# Patient Record
Sex: FEMALE | Race: WHITE | NOT HISPANIC OR LATINO | Employment: FULL TIME | URBAN - METROPOLITAN AREA
[De-identification: names, ages, dates, MRNs, and addresses within clinical notes are randomized per-mention and may not be internally consistent; named-entity substitution may affect disease eponyms.]

---

## 2017-07-06 ENCOUNTER — HOSPITAL ENCOUNTER (OUTPATIENT)
Dept: URGENT CARE | Facility: CLINIC | Age: 57
Discharge: HOME | End: 2017-07-06
Attending: FAMILY MEDICINE | Admitting: FAMILY MEDICINE

## 2018-07-19 RX ORDER — POTASSIUM CHLORIDE 750 MG/1
10 TABLET, EXTENDED RELEASE ORAL 2 TIMES DAILY
COMMUNITY
End: 2018-08-17 | Stop reason: DRUGHIGH

## 2018-07-19 RX ORDER — PREDNISONE 10 MG/1
10 TABLET ORAL DAILY
COMMUNITY
End: 2018-07-20

## 2018-07-19 RX ORDER — ESTRADIOL 10 UG/1
1 INSERT VAGINAL 2 TIMES WEEKLY
COMMUNITY

## 2018-07-20 ENCOUNTER — OFFICE VISIT (OUTPATIENT)
Dept: SURGERY | Facility: CLINIC | Age: 58
End: 2018-07-20

## 2018-07-20 VITALS
DIASTOLIC BLOOD PRESSURE: 80 MMHG | HEIGHT: 69 IN | BODY MASS INDEX: 16 KG/M2 | HEART RATE: 58 BPM | SYSTOLIC BLOOD PRESSURE: 120 MMHG | WEIGHT: 108 LBS | TEMPERATURE: 97.6 F | OXYGEN SATURATION: 98 %

## 2018-07-20 DIAGNOSIS — K64.8 PROLAPSED HEMORRHOIDS: Primary | ICD-10-CM

## 2018-07-20 PROCEDURE — 46600 DIAGNOSTIC ANOSCOPY SPX: CPT | Performed by: COLON & RECTAL SURGERY

## 2018-07-20 PROCEDURE — 99244 OFF/OP CNSLTJ NEW/EST MOD 40: CPT | Performed by: COLON & RECTAL SURGERY

## 2018-07-20 RX ORDER — HYDROCORTISONE ACETATE 25 MG/1
25 SUPPOSITORY RECTAL EVERY 12 HOURS
Qty: 14 SUPPOSITORY | Refills: 1 | Status: SHIPPED | OUTPATIENT
Start: 2018-07-20 | End: 2018-07-27

## 2018-07-20 NOTE — PROGRESS NOTES
Ebony Hobson is a 58 y.o. female who is seen as a consult at the request of Maritza Arteaga DO for extruding rectal tissue    HPI:    Pt c/o feeling gassy for the past 6 months  1 month ago, she was outside checking herself for ticks, and she thought she noted a rectal prolapse    No blood or mucus per rectum  She thinks the tissue might be partially extruding now    No pain    She states she had severe tear during labor with her first child 20 years ago    She has daily BM  Denies straining    She takes a fiber supplement daily    Most recent colonoscopy ~2011 in [place]: no polyps per pt    She is a runner    FamHx: grandmother gastric cancer age 80s.  No known hx colon polyps or colon cancer    She is doing [employer] at [employer] this fall: [employer] professor at [employer]    Past Medical History:   Diagnosis Date   • Anemia    • Effusion of right knee    • Ganglion cyst    • History of concussion    • Hypercalcemia    • Hypokalemia    • Kidney stones    • Lateral epicondylitis    • Medial meniscus tear     RIGHT KNEE   • Neuropathy    • OA (osteoarthritis) of knee    • Pain in right acromioclavicular joint    • Rectal prolapse    • Stress fracture of left foot     2ND AND 3RD METATARSALS       Past Surgical History:   Procedure Laterality Date   • ROTATOR CUFF REPAIR  2014       Social History:   reports that she has never smoked. She has never used smokeless tobacco. She reports that she drinks alcohol. She reports that she does not use drugs.      Marriage status:     Family History   Problem Relation Age of Onset   • Leukemia Father    • Stroke Other          Current Outpatient Prescriptions:   •  estradiol (VAGIFEM) 10 MCG tablet vaginal tablet, Insert 1 tablet into the vagina 2 (Two) Times a Week., Disp: , Rfl:   •  potassium chloride (K-DUR,KLOR-CON) 10 MEQ CR tablet, Take 10 mEq by mouth 2 (Two) Times a Day., Disp: , Rfl:     Allergy  Patient has no known allergies.    Review of  Systems   Constitution: Negative for decreased appetite, weakness and weight gain.   HENT: Negative for congestion, hearing loss and hoarse voice.    Eyes: Negative for blurred vision, discharge and visual disturbance.   Cardiovascular: Negative for chest pain, cyanosis and leg swelling.   Respiratory: Negative for cough, shortness of breath, sleep disturbances due to breathing and snoring.    Endocrine: Negative for cold intolerance and heat intolerance.   Hematologic/Lymphatic: Does not bruise/bleed easily.   Skin: Negative for itching, poor wound healing and skin cancer.   Musculoskeletal: Negative for arthritis, back pain, joint pain and joint swelling.   Gastrointestinal: Negative for abdominal pain, change in bowel habit, bowel incontinence and constipation.   Genitourinary: Negative for bladder incontinence, dysuria and hematuria.   Neurological: Negative for brief paralysis, excessive daytime sleepiness, focal weakness, headaches and light-headedness.   Psychiatric/Behavioral: Negative for altered mental status and hallucinations. The patient does not have insomnia.    Allergic/Immunologic: Negative for HIV exposure and persistent infections.   All other systems reviewed and are negative.      Vitals:    07/20/18 0948   BP: 120/80   Pulse: 58   Temp: 97.6 °F (36.4 °C)   SpO2: 98%     Body mass index is 15.95 kg/m².    Physical Exam   Constitutional: She is oriented to person, place, and time. She appears well-developed and well-nourished. No distress.   thin   HENT:   Head: Normocephalic and atraumatic.   Nose: Nose normal.   Mouth/Throat: Oropharynx is clear and moist.   Eyes: Pupils are equal, round, and reactive to light. Conjunctivae and EOM are normal.   Neck: Normal range of motion. No tracheal deviation present.   Pulmonary/Chest: Effort normal and breath sounds normal. No respiratory distress.   Abdominal: Soft. Bowel sounds are normal. She exhibits no distension.   Genitourinary:   Genitourinary  Comments: Perianal exam: external hem - IH prolapse visible on bear down.  No rectal prolapse on bear down  ERLINDA- slightly decreased tone, no masses  Anoscopy performed:  Grade 4 x 1 internal hem   Musculoskeletal: Normal range of motion. She exhibits no edema or deformity.   Neurological: She is alert and oriented to person, place, and time. No cranial nerve deficit. Coordination and gait normal.   Skin: Skin is warm and dry.   Psychiatric: She has a normal mood and affect. Her behavior is normal. Judgment normal.       Review of Medical Record: no pertinent records available for review    Assessment:  1. Prolapsed hemorrhoids        Plan:    Discussion with patient regarding prolapsed hemorrhoid.  No rectal prolapse.    For the hemorrhoids,she has not tried medical management.  I recommended for patient to treat conservatively with continuing fiber, and the hemorrhoid suppositories.  I wrote patient a prescription for hydrocortisone 2.5% supp and gave patient instructions.    Can consider in-office RBL if no improvement with conservative therapy.    RTC 4 weeks    Scribed for Paresh Dominguez MD by Clotilde Milan PA-C 7/20/2018  This patient was evaluated by me, recommendations made, documentation reviewed, edited, and revised by me, Paresh Dominguez MD

## 2018-08-17 ENCOUNTER — PROCEDURE VISIT (OUTPATIENT)
Dept: SURGERY | Facility: CLINIC | Age: 58
End: 2018-08-17

## 2018-08-17 VITALS
SYSTOLIC BLOOD PRESSURE: 112 MMHG | OXYGEN SATURATION: 96 % | TEMPERATURE: 97.9 F | HEIGHT: 70 IN | DIASTOLIC BLOOD PRESSURE: 66 MMHG | HEART RATE: 62 BPM | WEIGHT: 115.6 LBS | BODY MASS INDEX: 16.55 KG/M2

## 2018-08-17 DIAGNOSIS — K64.8 PROLAPSED HEMORRHOIDS: Primary | ICD-10-CM

## 2018-08-17 PROCEDURE — 99212 OFFICE O/P EST SF 10 MIN: CPT | Performed by: COLON & RECTAL SURGERY

## 2018-08-17 PROCEDURE — 46221 LIGATION OF HEMORRHOID(S): CPT | Performed by: COLON & RECTAL SURGERY

## 2018-08-17 RX ORDER — POTASSIUM CHLORIDE 20 MEQ/1
TABLET, EXTENDED RELEASE ORAL
COMMUNITY
Start: 2018-08-14

## 2018-08-17 NOTE — PROGRESS NOTES
"Ebony Hobson is a 58 y.o. female in for follow up of Prolapsed hemorrhoids    Tried fiber , hc supp,   Still feels extra tissue  Going on 3 mon work cruise starting early Sept    /66 (BP Location: Left arm, Patient Position: Sitting, Cuff Size: Adult)   Pulse 62   Temp 97.9 °F (36.6 °C) (Oral)   Ht 177.8 cm (70\")   Wt 52.4 kg (115 lb 9.6 oz)   LMP  (LMP Unknown)   SpO2 96%   Breastfeeding? No   BMI 16.59 kg/m²   Body mass index is 16.59 kg/m².      PE:  Physical Exam   Constitutional: She appears well-developed. No distress.   HENT:   Head: Normocephalic and atraumatic.   Abdominal: Soft. She exhibits no distension.   Genitourinary:   Genitourinary Comments: Perianal exam: external hem - minor  ERLINDA- good tone, no masses  Anoscopy performed:  Grade 3 x 3 internal hem     Musculoskeletal: Normal range of motion.   Neurological: She is alert.   Psychiatric: Thought content normal.         Assessment:   1. Prolapsed hemorrhoids         Plan:  Failed conservative management. Recommend RBL.  Discussed risk of bleeding and infection.  Pt wants to proceed.      Procedure: Rubber band ligation of internal hemorrhoid    FINDINGS: There were large enlarged hemorrhoids in the right anterior, right posterior and left lateral  postion.    PROCEDURE: After discussion and acceptance of all risks, benefits and alternatives an anoscope was inserted into the anal canal.  The hemorrhoic pedicals were defined and a rubber band was applied to the right anterior, right posterior and left lateral hemorrhoids approximately 1 cm above the dentate line.  The patient was discharged when comfortable with instructions as to outcome, complications and follow up.          "

## 2018-08-31 ENCOUNTER — OFFICE VISIT (OUTPATIENT)
Dept: SURGERY | Facility: CLINIC | Age: 58
End: 2018-08-31

## 2018-08-31 VITALS
HEIGHT: 70 IN | BODY MASS INDEX: 15.75 KG/M2 | SYSTOLIC BLOOD PRESSURE: 120 MMHG | DIASTOLIC BLOOD PRESSURE: 80 MMHG | OXYGEN SATURATION: 98 % | HEART RATE: 87 BPM | WEIGHT: 110 LBS

## 2018-08-31 DIAGNOSIS — K64.8 PROLAPSED HEMORRHOIDS: Primary | ICD-10-CM

## 2018-08-31 PROCEDURE — 99212 OFFICE O/P EST SF 10 MIN: CPT | Performed by: COLON & RECTAL SURGERY

## 2020-11-25 PROBLEM — M25.571 ARTHRALGIA OF RIGHT FOOT: Status: ACTIVE | Noted: 2017-07-06

## 2020-11-25 PROBLEM — S92.534A: Status: ACTIVE | Noted: 2017-07-06

## 2020-11-30 ENCOUNTER — OFFICE VISIT (OUTPATIENT)
Dept: SURGERY | Facility: CLINIC | Age: 60
End: 2020-11-30

## 2020-11-30 VITALS
SYSTOLIC BLOOD PRESSURE: 120 MMHG | BODY MASS INDEX: 17.12 KG/M2 | TEMPERATURE: 97.8 F | WEIGHT: 115.6 LBS | OXYGEN SATURATION: 99 % | HEIGHT: 69 IN | HEART RATE: 52 BPM | DIASTOLIC BLOOD PRESSURE: 74 MMHG

## 2020-11-30 DIAGNOSIS — M62.89 PELVIC FLOOR DYSFUNCTION IN FEMALE: ICD-10-CM

## 2020-11-30 DIAGNOSIS — K64.8 INTERNAL HEMORRHOIDS WITH COMPLICATION: ICD-10-CM

## 2020-11-30 DIAGNOSIS — R15.9 FECAL SOILING DUE TO FECAL INCONTINENCE: Primary | ICD-10-CM

## 2020-11-30 PROCEDURE — 99213 OFFICE O/P EST LOW 20 MIN: CPT | Performed by: COLON & RECTAL SURGERY

## 2021-02-05 ENCOUNTER — TRANSCRIBE ORDERS (OUTPATIENT)
Dept: ADMINISTRATIVE | Facility: HOSPITAL | Age: 61
End: 2021-02-05

## 2021-02-05 DIAGNOSIS — Z01.818 OTHER SPECIFIED PRE-OPERATIVE EXAMINATION: Primary | ICD-10-CM

## 2021-02-08 ENCOUNTER — LAB (OUTPATIENT)
Dept: LAB | Facility: HOSPITAL | Age: 61
End: 2021-02-08

## 2021-02-08 DIAGNOSIS — Z01.818 OTHER SPECIFIED PRE-OPERATIVE EXAMINATION: ICD-10-CM

## 2021-02-08 PROBLEM — S43.203A SUBLUXATION OF STERNOCLAVICULAR JOINT: Status: ACTIVE | Noted: 2020-09-10

## 2021-02-08 PROBLEM — M71.21 SYNOVIAL CYST OF RIGHT POPLITEAL SPACE: Status: ACTIVE | Noted: 2019-01-09

## 2021-02-08 PROBLEM — S92.819A: Status: ACTIVE | Noted: 2019-01-10

## 2021-02-08 PROBLEM — M23.40 LOOSE BODY IN KNEE: Status: ACTIVE | Noted: 2019-04-30

## 2021-02-08 PROBLEM — M17.11 OSTEOARTHRITIS OF RIGHT KNEE: Status: ACTIVE | Noted: 2018-06-04

## 2021-02-08 PROBLEM — S83.249A TEAR OF MEDIAL MENISCUS OF KNEE: Status: ACTIVE | Noted: 2018-06-04

## 2021-02-08 PROBLEM — M84.376A STRESS FRACTURE OF FOOT: Status: ACTIVE | Noted: 2017-10-23

## 2021-02-08 PROBLEM — E55.9 VITAMIN D DEFICIENCY: Status: ACTIVE | Noted: 2018-08-14

## 2021-02-08 PROBLEM — M19.072 OSTEOARTHRITIS OF LEFT FOOT: Status: ACTIVE | Noted: 2019-04-30

## 2021-02-08 PROBLEM — M25.519 STERNOCLAVICULAR JOINT PAIN: Status: ACTIVE | Noted: 2020-09-10

## 2021-02-08 LAB — SARS-COV-2 ORF1AB RESP QL NAA+PROBE: NOT DETECTED

## 2021-02-08 PROCEDURE — C9803 HOPD COVID-19 SPEC COLLECT: HCPCS

## 2021-02-08 PROCEDURE — U0004 COV-19 TEST NON-CDC HGH THRU: HCPCS

## 2021-02-10 ENCOUNTER — ANESTHESIA (OUTPATIENT)
Dept: GASTROENTEROLOGY | Facility: HOSPITAL | Age: 61
End: 2021-02-10

## 2021-02-10 ENCOUNTER — ANESTHESIA EVENT (OUTPATIENT)
Dept: GASTROENTEROLOGY | Facility: HOSPITAL | Age: 61
End: 2021-02-10

## 2021-02-10 ENCOUNTER — HOSPITAL ENCOUNTER (OUTPATIENT)
Facility: HOSPITAL | Age: 61
Setting detail: HOSPITAL OUTPATIENT SURGERY
Discharge: HOME | End: 2021-02-10
Attending: COLON & RECTAL SURGERY | Admitting: COLON & RECTAL SURGERY

## 2021-02-10 VITALS
OXYGEN SATURATION: 98 % | DIASTOLIC BLOOD PRESSURE: 72 MMHG | HEART RATE: 85 BPM | BODY MASS INDEX: 16.69 KG/M2 | WEIGHT: 113 LBS | RESPIRATION RATE: 18 BRPM | SYSTOLIC BLOOD PRESSURE: 109 MMHG

## 2021-02-10 DIAGNOSIS — M62.89 PELVIC FLOOR DYSFUNCTION IN FEMALE: ICD-10-CM

## 2021-02-10 DIAGNOSIS — K64.8 INTERNAL HEMORRHOIDS WITH COMPLICATION: ICD-10-CM

## 2021-02-10 DIAGNOSIS — R15.9 FECAL SOILING DUE TO FECAL INCONTINENCE: ICD-10-CM

## 2021-02-10 PROCEDURE — 45391 COLONOSCOPY W/ENDOSCOPE US: CPT | Performed by: COLON & RECTAL SURGERY

## 2021-02-10 PROCEDURE — 25010000002 PROPOFOL 10 MG/ML EMULSION: Performed by: ANESTHESIOLOGY

## 2021-02-10 PROCEDURE — 76872 US TRANSRECTAL: CPT | Performed by: COLON & RECTAL SURGERY

## 2021-02-10 PROCEDURE — 91122 HC ANORECTAL MANOMETRY: CPT | Performed by: COLON & RECTAL SURGERY

## 2021-02-10 RX ORDER — LIDOCAINE HYDROCHLORIDE 20 MG/ML
INJECTION, SOLUTION INFILTRATION; PERINEURAL AS NEEDED
Status: DISCONTINUED | OUTPATIENT
Start: 2021-02-10 | End: 2021-02-10 | Stop reason: SURG

## 2021-02-10 RX ORDER — DIPHENOXYLATE HYDROCHLORIDE AND ATROPINE SULFATE 2.5; .025 MG/1; MG/1
TABLET ORAL DAILY
COMMUNITY

## 2021-02-10 RX ORDER — PROMETHAZINE HYDROCHLORIDE 25 MG/1
25 TABLET ORAL ONCE AS NEEDED
Status: DISCONTINUED | OUTPATIENT
Start: 2021-02-10 | End: 2021-02-10 | Stop reason: HOSPADM

## 2021-02-10 RX ORDER — PROPOFOL 10 MG/ML
VIAL (ML) INTRAVENOUS CONTINUOUS PRN
Status: DISCONTINUED | OUTPATIENT
Start: 2021-02-10 | End: 2021-02-10 | Stop reason: SURG

## 2021-02-10 RX ORDER — EPHEDRINE SULFATE 50 MG/ML
INJECTION, SOLUTION INTRAVENOUS AS NEEDED
Status: DISCONTINUED | OUTPATIENT
Start: 2021-02-10 | End: 2021-02-10 | Stop reason: SURG

## 2021-02-10 RX ORDER — PROMETHAZINE HYDROCHLORIDE 25 MG/1
25 SUPPOSITORY RECTAL ONCE AS NEEDED
Status: DISCONTINUED | OUTPATIENT
Start: 2021-02-10 | End: 2021-02-10 | Stop reason: HOSPADM

## 2021-02-10 RX ORDER — SODIUM CHLORIDE, SODIUM LACTATE, POTASSIUM CHLORIDE, CALCIUM CHLORIDE 600; 310; 30; 20 MG/100ML; MG/100ML; MG/100ML; MG/100ML
30 INJECTION, SOLUTION INTRAVENOUS CONTINUOUS PRN
Status: DISCONTINUED | OUTPATIENT
Start: 2021-02-10 | End: 2021-02-10 | Stop reason: HOSPADM

## 2021-02-10 RX ADMIN — EPHEDRINE SULFATE 20 MG: 50 INJECTION INTRAVENOUS at 09:58

## 2021-02-10 RX ADMIN — LIDOCAINE HYDROCHLORIDE 60 MG: 20 INJECTION, SOLUTION INFILTRATION; PERINEURAL at 09:35

## 2021-02-10 RX ADMIN — SODIUM CHLORIDE, POTASSIUM CHLORIDE, SODIUM LACTATE AND CALCIUM CHLORIDE 30 ML/HR: 600; 310; 30; 20 INJECTION, SOLUTION INTRAVENOUS at 09:05

## 2021-02-10 RX ADMIN — SODIUM CHLORIDE, POTASSIUM CHLORIDE, SODIUM LACTATE AND CALCIUM CHLORIDE: 600; 310; 30; 20 INJECTION, SOLUTION INTRAVENOUS at 09:33

## 2021-02-10 RX ADMIN — PROPOFOL 200 MCG/KG/MIN: 10 INJECTION, EMULSION INTRAVENOUS at 09:35

## 2021-02-10 NOTE — H&P
Ebony Hobson is a 60 y.o. female in for complaint of Fecal soiling due to fecal incontinence     Internal hemorrhoids with complication     Pelvic floor dysfunction in female     S/p RBL 8/17/2018  Feels like anus is not tight; having flatus incontinence and fecal smearing on underwear. Some fecal urgency. Symptoms over the past year. Denies rb or pain; possible minor prolapsed tissue. Denies anal cream, ss. Uses fibercon 2 pills daily. BM daily; [place] 2-4. No straining. Screening cy 10 yrs ago-no polyps. No fam hx colon polyps or ca. Hx Grade IV tear with first child.     Past Medical History:   Diagnosis Date   • Anemia    • Arthralgia of right acromioclavicular joint 10/1/2014   • Arthralgia of right foot 7/6/2017   • Back pain 11/14/2012   • Benign lipomatous neoplasm, unspecified 10/17/2016   • Closed fracture of sesamoid bone of foot 1/10/2019    Description: Left, lateral/fibular sesamoid chronic fragmented   • COVID-19 virus infection 01/25/2021    SEEN AT [facility]   • Effusion of right knee    • Fecal soiling due to fecal incontinence 11/2020   • Fracture of right toe 07/16/2017    RIGHT SMALL TOE, SEEN AT [facility]   • Ganglion cyst    • Hemorrhoids    • History of concussion    • Hypercalcemia    • Hypokalemia    • Kidney stones    • Lateral epicondylitis    • Loose body in knee 4/30/2019    Description: Right   • Medial meniscus tear     RIGHT KNEE   • Nephropathy 12/29/2016   • Neuropathy    • Nondisplaced fracture of distal phalanx of right lesser toe(s), initial encounter for closed fracture 7/6/2017   • OA (osteoarthritis) of knee    • Pain in right acromioclavicular joint    • Pelvic floor dysfunction in female 11/2020   • Rectal prolapse    • Sternoclavicular joint pain 9/10/2020    Description: Right   • Stress fracture of foot 10/23/2017    Description: Left, 2nd and 3rd   • Stress fracture of left foot     2ND AND 3RD METATARSALS   • Subluxation of sternoclavicular joint 9/10/2020     "Description: Right   • Synovial cyst of right popliteal space 1/9/2019   • Tear of medial meniscus of knee 6/4/2018    Description: Right  Description: Right   • Urinary tract infection 11/14/2012   • Vitamin D deficiency 8/14/2018       Past Surgical History:   Procedure Laterality Date   • HEMORRHOID BANDING N/A 08/17/2018    DR. LING [place]   • ROTATOR CUFF REPAIR Right 2014       /74 (BP Location: Left arm, Patient Position: Sitting, Cuff Size: Small Adult)   Pulse 52   Temp 97.8 °F (36.6 °C)   Ht 175.3 cm (69\")   Wt 52.4 kg (115 lb 9.6 oz)   LMP  (LMP Unknown)   SpO2 99%   Breastfeeding No   BMI 17.07 kg/m²   Body mass index is 17.07 kg/m².        PE:  Physical Exam  Exam conducted with a chaperone present.   Constitutional:       General: She is not in acute distress.     Appearance: She is well-developed.   HENT:      Head: Normocephalic and atraumatic.   Abdominal:      General: There is no distension.      Palpations: Abdomen is soft.   Genitourinary:     Comments: Perianal exam: external hem - 1 small  ERLINDA- weak tone especially anteriorly, no masses    Musculoskeletal: Normal range of motion.   Neurological:      Mental Status: She is alert.   Psychiatric:         Thought Content: Thought content normal.               Assessment:   1. Fecal soiling due to fecal incontinence    2. Internal hemorrhoids with complication    3. Pelvic floor dysfunction in female       S/p RBL 8/17/2018  Plan:  I recommend fiber therapy and detailed and gave written instructions on how to achieve a high fiber diet. Increase fiber to 2 pills am and pm daily ongoing. I discussed SNS implantation and pelvic floor PT; either can be used to assist with sphincter control and pelvic floor strengthening. I recommend cy (routine) with eus to determine sphincter thinning. Written referral info provided for [facility] for pelvic floor strengthening. Risks, benefits, alternatives explained; patient questions answered; patient " agrees and wishes to proceed.

## 2021-02-10 NOTE — ANESTHESIA PREPROCEDURE EVALUATION
Anesthesia Evaluation     NPO Solid Status: > 8 hours             Airway   Mallampati: II  TM distance: >3 FB  Neck ROM: full  Dental - normal exam     Pulmonary - normal exam   (-) COPD, asthma  Cardiovascular - normal exam        Neuro/Psych  GI/Hepatic/Renal/Endo    (+)   renal disease stones,     Musculoskeletal     (+) back pain,   Abdominal    Substance History      OB/GYN          Other                        Anesthesia Plan    ASA 2     MAC       Anesthetic plan, all risks, benefits, and alternatives have been provided, discussed and informed consent has been obtained with: patient.

## 2021-02-10 NOTE — OP NOTE
02/10/21    Pre-and postoperative diagnosis: fecal incontinence    Surgeon: Paresh Dominguez MD    Indication:  Patient is a 60 y.o. year-old female who complains about fecal incontinence.     Procedure: Endorectal ultrasound    Patient was in the left lateral decubitus position with copious K-Y jelly endorectal ultrasound wand was placed in the anal canal.  Multiple images were obtained.  Puborectalis muscle was visualized.  Internal and external sphincter were visualized.  The internal sphincter is distrupted anteriorly.  External sphincter is intact..  Perineal body measures 6 mm.

## 2021-02-10 NOTE — ANESTHESIA POSTPROCEDURE EVALUATION
Patient: Ebony Hobson    Procedure Summary     Date: 02/10/21 Room / Location: [facility] ENDOSCOPY 9 / [facility] ENDOSCOPY    Anesthesia Start: 0933 Anesthesia Stop: 1017    Procedures:       COLONOSCOPY to cecum (N/A )      ANORECTAL MANOMETRY (N/A Anus)      ULTRASOUND TRANSRECTAL (Rectum) Diagnosis:       Internal hemorrhoids with complication      Fecal soiling due to fecal incontinence      Pelvic floor dysfunction in female      (Internal hemorrhoids with complication [K64.8])      (Fecal soiling due to fecal incontinence [R15.9])      (Pelvic floor dysfunction in female [M62.89])    Surgeon: Paresh Dominguez MD Provider: Seth Castillo MD    Anesthesia Type: MAC ASA Status: 2          Anesthesia Type: MAC    Vitals  Vitals Value Taken Time   /72 02/10/21 1034   Temp     Pulse 85 02/10/21 1034   Resp 18 02/10/21 1034   SpO2 98 % 02/10/21 1034           Post Anesthesia Care and Evaluation    Patient location during evaluation: bedside  Pain management: adequate  Airway patency: patent  Anesthetic complications: No anesthetic complications    Cardiovascular status: acceptable  Respiratory status: acceptable  Hydration status: acceptable

## 2021-03-12 ENCOUNTER — DOCUMENTATION (OUTPATIENT)
Dept: SURGERY | Facility: CLINIC | Age: 61
End: 2021-03-12

## 2021-03-22 ENCOUNTER — TREATMENT (OUTPATIENT)
Dept: PHYSICAL THERAPY | Facility: CLINIC | Age: 61
End: 2021-03-22

## 2021-03-22 DIAGNOSIS — M25.511 PAIN OF RIGHT STERNOCLAVICULAR JOINT: Primary | ICD-10-CM

## 2021-03-22 PROCEDURE — 97112 NEUROMUSCULAR REEDUCATION: CPT | Performed by: PHYSICAL THERAPIST

## 2021-03-22 PROCEDURE — 97110 THERAPEUTIC EXERCISES: CPT | Performed by: PHYSICAL THERAPIST

## 2021-03-22 PROCEDURE — 97161 PT EVAL LOW COMPLEX 20 MIN: CPT | Performed by: PHYSICAL THERAPIST

## 2021-03-22 NOTE — PROGRESS NOTES
Physical Therapy Initial Evaluation and Plan of Care    Patient: Ebony Hobson   : 1960  Diagnosis/ICD-10 Code:  Pain of right sternoclavicular joint [M25.511]  Referring practitioner: Ruiz Hale MD  Date of Initial Visit: 3/22/2021  Today's Date: 3/22/2021  Patient seen for 1 sessions           Subjective Questionnaire: NDI 10%      Subjective 61 yo female with c/o R SC pain. Pt dislocated her R SC joint last summer when she was working in her yard. Pt had a reconstruction on 21 by Dr. Hale. Pt's primary c/o is R cspine pain and weakness, difficulty sleeping, and her concern re: her posture. 3/10 pain at worst, 0/10 now. Denies UE symptoms. R UE dominant. Pt had RTC repair in . Notes swelling and stiffness along R SCM.  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    Scapulae  Right elevated and right protracted.    Cervical/Thoracic Screen   Cervical range of motion within normal limits    Active Range of Motion   Left Shoulder   Normal active range of motion    Right Shoulder   Normal active range of motion    Strength/Myotome Testing     Right Shoulder     Isolated Muscles   Lower trapezius: 3   Middle trapezius: 3     Additional Strength Details  5/5 otherwise bilaterally   Tender along R SCM      Assessment & Plan     Assessment  Impairments: abnormal coordination, abnormal muscle firing, abnormal or restricted ROM, activity intolerance, impaired physical strength, lacks appropriate home exercise program and pain with function  Other impairment: impaired posture, limited parascapular strength  Assessment details: 61 yo female with c/o R SC pain s/p reconstruction on 21. Pt is with a good response to treatment this date and would benefit from further evaluation and treatment to address the above impairments.  Prognosis: good  Functional Limitations: carrying objects, lifting, sleeping, pulling,  pushing, uncomfortable because of pain, reaching behind back, reaching overhead and unable to perform repetitive tasks  Goals  Plan Goals: Short term goals, 1 week: Tolerate HEP progression.  Voice compliance with activity modification.  Report improvement in symptoms.    LTGs, 5 weeks: Improve NDI score to 4%.  SCM flexibility to be at or near wfl.  4+/5 strength throughout.  Show good scapulothoracic mechanics.  Independent with posture correction.  Independent with HEP.    Plan  Therapy options: will be seen for skilled physical therapy services  Other planned modality interventions: modalities prn  Planned therapy interventions: flexibility, functional ROM exercises, home exercise program, manual therapy, neuromuscular re-education, strengthening, stretching and therapeutic activities  Frequency: 1-2 times per week.  Duration in visits: 10  Treatment plan discussed with: patient      Timed:         Manual Therapy:         mins  79446;     Therapeutic Exercise:    15     mins  55376;     Neuromuscular Parvin:    15    mins  88569;    Therapeutic Activity:          mins  61720;     Gait Training:           mins  59788;     Ultrasound:          mins  34587;    Ionto                                   mins   54061  Self Care                            mins   02460    Un-Timed:  Electrical Stimulation:         mins  41856 ( );  Traction          mins 38410  Low Eval     15     Mins  59813  Mod Eval          Mins  31380  High Eval                            Mins  85466  Re-Eval                               mins  98758        Timed Treatment:   30   mins   Total Treatment:     45   mins    PT SIGNATURE: Marshal Stauffer PT, DPT, OCS  [place] license: [id number]  DATE TREATMENT INITIATED: 3/22/2021    Initial Certification  Certification Period: 6/20/2021  I certify that the therapy services are furnished while this patient is under my care.  The services outlined above are required for this patient and will be reviewed  every 90 days.     PHYSICIAN: _____________________________    Ruiz Hale MD      DATE:     Please sign and return via fax to [phone]. Thank you, [facility].

## 2023-06-23 PROBLEM — E87.6 HYPOKALEMIA: Status: ACTIVE | Noted: 2022-05-25

## 2023-08-10 RX ORDER — NEOMYCIN SULFATE, POLYMYXIN B SULFATE, AND DEXAMETHASONE 3.5; 10000; 1 MG/G; [USP'U]/G; MG/G
2 OINTMENT OPHTHALMIC EVERY 8 HOURS SCHEDULED
COMMUNITY
Start: 2023-07-07

## 2023-08-14 ENCOUNTER — OFFICE VISIT (OUTPATIENT)
Dept: SURGERY | Facility: CLINIC | Age: 63
End: 2023-08-14
Payer: COMMERCIAL

## 2023-08-14 VITALS
WEIGHT: 112.3 LBS | HEART RATE: 47 BPM | HEIGHT: 70 IN | SYSTOLIC BLOOD PRESSURE: 122 MMHG | TEMPERATURE: 97 F | DIASTOLIC BLOOD PRESSURE: 72 MMHG | BODY MASS INDEX: 16.08 KG/M2 | OXYGEN SATURATION: 99 %

## 2023-08-14 DIAGNOSIS — R15.2 INCONTINENCE OF FECES WITH FECAL URGENCY: Primary | ICD-10-CM

## 2023-08-14 DIAGNOSIS — R15.9 INCONTINENCE OF FECES WITH FECAL URGENCY: Primary | ICD-10-CM

## 2023-08-14 PROCEDURE — 99215 OFFICE O/P EST HI 40 MIN: CPT | Performed by: PHYSICIAN ASSISTANT

## 2023-08-14 NOTE — PROGRESS NOTES
Ebony Hobson is a 63 y.o. female in for follow up of internal hemorrhoid with complication. At her last visit on 6/23/23, she was noted to have an irritated internal hemorrhoid in the right anterior position and was recommended to increase fiber supplement and start topical 2.5% hydrocortisone.    The patient reports that she has not noticed much of a difference in her symptoms. She was on vacation for the past couple of weeks and left her medications at home, therefore, has not been using the medication consistently. She denies significant pain or bleeding. She feels that it is more open than it should be at this point. She does not experience much leakage. She feels that she does not have much control when passing flatus; that is her main concern. She has increased her fiber intake and has not noticed much of a difference. Her stools have loosened up slightly, but not a great deal of difference in her stool consistency and feels that it has made no impact in her other symptoms. She is a runner and states that she does not drink much water. She has tried Immodium in the past and feels that it would not do well for her being that her stools are not loose.     She underwent anorectal manometry in 02/2021. She felt as though that issue was resolved with the rubber banding for some time, but then that issue returned. She denies ever experiencing bleeding. She feels that while the issue returned, it is not to the extent that it was in the past. She was told that after her colonoscopy and rubber banding that everything was fine and she has never followed back up due to this.     The patient reports that she has had four vaginal births and with her first child she sustained a severe tear and she was told by her gynecologist that even though she sutured the tear, it may cause issues in the future. She feels that this may lend to weakness in that area along with her being a runner. She does not feel that Kegel exercises  "improve this issue. She has never attended pelvic floor physical therapy. She has had fecal incontinence in the past.     /72 (BP Location: Right leg, Patient Position: Sitting, Cuff Size: Pediatric)   Pulse (!) 47   Temp 97 øF (36.1 øC) (Temporal)   Ht 177.8 cm (70\")   Wt 50.9 kg (112 lb 4.8 oz)   LMP  (LMP Unknown)   SpO2 99%   Breastfeeding No   BMI 16.11 kg/mý   Body mass index is 16.11 kg/mý.  -  Physical Exam  No acute distress    Assessment:   1. Incontinence of feces with fecal urgency       Plan:  Schedule basic evaluation for sacral neuromodulation. Check insurance coverage for this procedure.  Offered pelvic floor PT with biofeedback as option; pt declines.   Pt has tried fiber supplementation without relief. Imodium would be contraindicated due to constipation, hard stools, and straining.     Time Spent: I spent 52 minutes caring for Ebony on this date of service. This time includes time spent by me in the following activities: preparing for the visit, reviewing tests, performing a medically appropriate examination and/or evaluation, counseling and educating the patient/family/caregiver, ordering medications, tests, or procedures, referring and communicating with other health care professionals, documenting information in the medical record, and care coordination.     Irina Canela PA-C  Physician Assistant  Colorectal Surgery    Transcribed from ambient dictation for Irina Canela PA-C by Sunita Baldwin.  08/14/23   11:52 EDT    Patient or patient representative verbalized consent to the visit recording.  I have personally performed the services described in this document as transcribed by the above individual, and it is both accurate and complete.      "

## (undated) DEVICE — Device: Brand: DEFENDO AIR/WATER/SUCTION AND BIOPSY VALVE

## (undated) DEVICE — TUBING, SUCTION, 1/4" X 10', STRAIGHT: Brand: MEDLINE

## (undated) DEVICE — ADAPT CLN BIOGUARD AIR/H2O DISP

## (undated) DEVICE — CANN O2 ETCO2 FITS ALL CONN CO2 SMPL A/ 7IN DISP LF

## (undated) DEVICE — THE TORRENT IRRIGATION SCOPE CONNECTOR IS USED WITH THE TORRENT IRRIGATION TUBING TO PROVIDE IRRIGATION FLUIDS SUCH AS STERILE WATER DURING GASTROINTESTINAL ENDOSCOPIC PROCEDURES WHEN USED IN CONJUNCTION WITH AN IRRIGATION PUMP (OR ELECTROSURGICAL UNIT).: Brand: TORRENT

## (undated) DEVICE — GOWN SURG AERO CHROME XL